# Patient Record
Sex: FEMALE | URBAN - METROPOLITAN AREA
[De-identification: names, ages, dates, MRNs, and addresses within clinical notes are randomized per-mention and may not be internally consistent; named-entity substitution may affect disease eponyms.]

---

## 2024-07-15 ENCOUNTER — APPOINTMENT (RX ONLY)
Dept: URBAN - METROPOLITAN AREA CLINIC 17 | Facility: CLINIC | Age: 44
Setting detail: DERMATOLOGY
End: 2024-07-15

## 2024-07-15 DIAGNOSIS — L57.8 OTHER SKIN CHANGES DUE TO CHRONIC EXPOSURE TO NONIONIZING RADIATION: ICD-10-CM | Status: INADEQUATELY CONTROLLED

## 2024-07-15 DIAGNOSIS — L57.0 ACTINIC KERATOSIS: ICD-10-CM | Status: INADEQUATELY CONTROLLED

## 2024-07-15 DIAGNOSIS — Z71.89 OTHER SPECIFIED COUNSELING: ICD-10-CM | Status: STABLE

## 2024-07-15 DIAGNOSIS — L50.8 OTHER URTICARIA: ICD-10-CM | Status: INADEQUATELY CONTROLLED

## 2024-07-15 PROCEDURE — ? OTHER

## 2024-07-15 PROCEDURE — ? TREATMENT REGIMEN

## 2024-07-15 PROCEDURE — 99203 OFFICE O/P NEW LOW 30 MIN: CPT

## 2024-07-15 PROCEDURE — ? COUNSELING

## 2024-07-15 PROCEDURE — ? PRESCRIPTION

## 2024-07-15 RX ORDER — FEXOFENADINE HYDROCHLORIDE 180 MG/1
1 TABLET TABLET ORAL DAILY
Qty: 30 | Refills: 11 | Status: ERX

## 2024-07-15 RX ORDER — TRETIONIN 0.25 MG/G
1 CREAM TOPICAL QHS
Qty: 45 | Refills: 11 | Status: ERX

## 2024-07-15 RX ORDER — FLUOROURACIL 5 MG/G
1 CREAM TOPICAL BID
Qty: 40 | Refills: 1 | Status: ERX | COMMUNITY
Start: 2024-07-15

## 2024-07-15 RX ORDER — HYDROXYZINE HYDROCHLORIDE 10 MG/1
1 TABLET, FILM COATED ORAL QHS
Qty: 30 | Refills: 11 | Status: ERX

## 2024-07-15 RX ADMIN — FLUOROURACIL 1: 5 CREAM TOPICAL at 00:00

## 2024-07-15 ASSESSMENT — LOCATION DETAILED DESCRIPTION DERM
LOCATION DETAILED: RIGHT MEDIAL MALAR CHEEK
LOCATION DETAILED: NASAL SUPRATIP
LOCATION DETAILED: RIGHT CENTRAL MALAR CHEEK
LOCATION DETAILED: LEFT CENTRAL MALAR CHEEK
LOCATION DETAILED: LEFT SUPERIOR MEDIAL UPPER BACK

## 2024-07-15 ASSESSMENT — LOCATION SIMPLE DESCRIPTION DERM
LOCATION SIMPLE: NOSE
LOCATION SIMPLE: LEFT UPPER BACK
LOCATION SIMPLE: RIGHT CHEEK
LOCATION SIMPLE: LEFT CHEEK

## 2024-07-15 ASSESSMENT — LOCATION ZONE DERM
LOCATION ZONE: NOSE
LOCATION ZONE: FACE
LOCATION ZONE: TRUNK

## 2024-07-15 NOTE — PROCEDURE: OTHER
Detail Level: Detailed
Other (Free Text): Discussed with patient what an allergic reaction would look like with 5% fluorouracil. Recommended patient to try a test spot behind ear or one spot on face.
Render Risk Assessment In Note?: no
Note Text (......Xxx Chief Complaint.): This diagnosis correlates with the
Other (Free Text): Patient states she was previously diagnosed with chronic urticaria; the combination of Allegra and hydroxyzine tend to work the best for her. She has previously been prescribed an epi-pen but denies history of angioedema or anaphylaxis. She will call when a refill is needed.

## 2024-07-15 NOTE — PROCEDURE: TREATMENT REGIMEN
Detail Level: Detailed
Plan: Discussed using 5% fluorouracil twice a day until red crusty spots. Recommends applying to whole face until only spot treatment is necessary.
Continue Regimen: Fexofenadine 180mg\\nHydroxyzine 10mg

## 2024-07-15 NOTE — HPI: SKIN LESION
Is This A New Presentation, Or A Follow-Up?: Skin Lesions
What Type Of Note Output Would You Prefer (Optional)?: Standard Output
How Severe Is Your Skin Lesion?: mild
Has Your Skin Lesion Been Treated?: not been treated
Additional History: Patient states previous dermatologist diagnosed AK’s on the face, but did not do treatment. Hx. Of BCC on left shoulder. Patient states she uses tretinoin on face.

## 2024-07-15 NOTE — PROCEDURE: COUNSELING
Nicotinamide Supplementation Recommendations: Discussed with patient the use of nicotinamide 250 mg one pill twice daily
Sunscreen Recommendations: Discussed importance of regular photo protection with sunscreen and photo protective clothing.
Detail Level: Detailed
Detail Level: Generalized
Skin Checks Recommendations: Check skin on a monthly basis for changes and to become familiar with moles.
Topical Retinoids Recommendations: Tretinoin 0.025% called to pharmacy.  Begin weekly application and increase as tolerated.
Sunscreen Recommendations: Discussed importance of photo protection with sun screen and photo protective clothing
Detail Level: Zone